# Patient Record
Sex: MALE
[De-identification: names, ages, dates, MRNs, and addresses within clinical notes are randomized per-mention and may not be internally consistent; named-entity substitution may affect disease eponyms.]

---

## 2020-08-25 PROBLEM — Z00.00 ENCOUNTER FOR PREVENTIVE HEALTH EXAMINATION: Status: ACTIVE | Noted: 2020-08-25

## 2020-08-26 ENCOUNTER — APPOINTMENT (OUTPATIENT)
Dept: UROLOGY | Facility: CLINIC | Age: 40
End: 2020-08-26
Payer: MEDICAID

## 2020-08-26 VITALS
DIASTOLIC BLOOD PRESSURE: 89 MMHG | BODY MASS INDEX: 27.58 KG/M2 | OXYGEN SATURATION: 99 % | SYSTOLIC BLOOD PRESSURE: 142 MMHG | TEMPERATURE: 98.1 F | WEIGHT: 182 LBS | RESPIRATION RATE: 16 BRPM | HEART RATE: 71 BPM | HEIGHT: 68 IN

## 2020-08-26 DIAGNOSIS — Z98.52 VASECTOMY STATUS: ICD-10-CM

## 2020-08-26 DIAGNOSIS — Z72.89 OTHER PROBLEMS RELATED TO LIFESTYLE: ICD-10-CM

## 2020-08-26 PROCEDURE — 99203 OFFICE O/P NEW LOW 30 MIN: CPT

## 2020-08-26 NOTE — ASSESSMENT
[FreeTextEntry1] : 40 year old male with 36 year old partner\par both with proven fertility\par I had an extensive discussion with this patient regarding vasectomy reversal. We discussed the difference between technical and functional successes. We discussed the microsurgical technique that we utilized. We discussed the difference between vasovasostomy and vasoepididymostomy.  We discussed the decision making process for the determination of which procedure is appropriate. We discussed alternatives including adoption, testicular extraction of sperm with in vitro fertilization, donor insemination or no intervention. We discussed postoperative course the risks and complications associated with the procedure. We discussed the need to abstain from ejaculation for one month after surgery and to avoid strenuous activity for 6 weeks. \par We discussed findings suggest need for VE\par They wish to proceed with reconstruction rather than TESE and IVF

## 2020-08-26 NOTE — PHYSICAL EXAM
[General Appearance - Well Nourished] : well nourished [General Appearance - Well Developed] : well developed [Normal Appearance] : normal appearance [Well Groomed] : well groomed [] : no respiratory distress [Edema] : no peripheral edema [General Appearance - In No Acute Distress] : no acute distress [Exaggerated Use Of Accessory Muscles For Inspiration] : no accessory muscle use [Respiration, Rhythm And Depth] : normal respiratory rhythm and effort [Abdomen Soft] : soft [Costovertebral Angle Tenderness] : no ~M costovertebral angle tenderness [Abdomen Tenderness] : non-tender [Urinary Bladder Findings] : the bladder was normal on palpation [Urethral Meatus] : meatus normal [Penis Abnormality] : normal uncircumcised penis [Testes Mass (___cm)] : there were no testicular masses [Testes Tenderness] : no tenderness of the testes [Scrotum] : the scrotum was normal [No Prostate Nodules] : no prostate nodules [FreeTextEntry1] : vasectomy site midscrotum; nodular epididymis bilaterally

## 2020-08-26 NOTE — PHYSICAL EXAM
[General Appearance - Well Developed] : well developed [General Appearance - Well Nourished] : well nourished [Well Groomed] : well groomed [Normal Appearance] : normal appearance [Edema] : no peripheral edema [] : no respiratory distress [General Appearance - In No Acute Distress] : no acute distress [Respiration, Rhythm And Depth] : normal respiratory rhythm and effort [Abdomen Soft] : soft [Exaggerated Use Of Accessory Muscles For Inspiration] : no accessory muscle use [Costovertebral Angle Tenderness] : no ~M costovertebral angle tenderness [Abdomen Tenderness] : non-tender [Penis Abnormality] : normal uncircumcised penis [Urinary Bladder Findings] : the bladder was normal on palpation [Urethral Meatus] : meatus normal [Scrotum] : the scrotum was normal [Testes Mass (___cm)] : there were no testicular masses [Testes Tenderness] : no tenderness of the testes [No Prostate Nodules] : no prostate nodules [FreeTextEntry1] : vasectomy site midscrotum; nodular epididymis bilaterally

## 2020-08-26 NOTE — HISTORY OF PRESENT ILLNESS
[None] : no symptoms [Erectile Dysfunction] : no Erectile Dysfunction [FreeTextEntry1] : 40 year old \par s/p vasectomy 2013\par 4 children prior to vasectomy\par Samia Li 34\par domestic partner x 8 year\par Samia has 2 children (14 and 15)\par